# Patient Record
Sex: MALE | Race: WHITE | Employment: FULL TIME | URBAN - METROPOLITAN AREA
[De-identification: names, ages, dates, MRNs, and addresses within clinical notes are randomized per-mention and may not be internally consistent; named-entity substitution may affect disease eponyms.]

---

## 2022-02-08 ENCOUNTER — APPOINTMENT (OUTPATIENT)
Dept: GENERAL RADIOLOGY | Age: 42
End: 2022-02-08
Payer: COMMERCIAL

## 2022-02-08 ENCOUNTER — HOSPITAL ENCOUNTER (EMERGENCY)
Age: 42
Discharge: HOME | End: 2022-02-08
Payer: COMMERCIAL

## 2022-02-08 VITALS
HEART RATE: 60 BPM | SYSTOLIC BLOOD PRESSURE: 126 MMHG | DIASTOLIC BLOOD PRESSURE: 70 MMHG | OXYGEN SATURATION: 98 % | TEMPERATURE: 98.3 F | RESPIRATION RATE: 18 BRPM

## 2022-02-08 DIAGNOSIS — B34.9 VIRAL ILLNESS: ICD-10-CM

## 2022-02-08 DIAGNOSIS — R07.89 CHEST WALL PAIN: Primary | ICD-10-CM

## 2022-02-08 DIAGNOSIS — R53.81 MALAISE: ICD-10-CM

## 2022-02-08 DIAGNOSIS — R50.9 FEBRILE ILLNESS, ACUTE: ICD-10-CM

## 2022-02-08 LAB
A/G RATIO: 2 (ref 1.1–2.2)
ALBUMIN SERPL-MCNC: 4.7 G/DL (ref 3.4–5)
ALP BLD-CCNC: 58 U/L (ref 40–129)
ALT SERPL-CCNC: 28 U/L (ref 10–40)
ANION GAP SERPL CALCULATED.3IONS-SCNC: 15 MMOL/L (ref 3–16)
AST SERPL-CCNC: 24 U/L (ref 15–37)
BASOPHILS ABSOLUTE: 0 K/UL (ref 0–0.2)
BASOPHILS RELATIVE PERCENT: 0.5 %
BILIRUB SERPL-MCNC: 0.8 MG/DL (ref 0–1)
BILIRUBIN URINE: NEGATIVE
BLOOD, URINE: NEGATIVE
BUN BLDV-MCNC: 18 MG/DL (ref 7–20)
CALCIUM SERPL-MCNC: 9.1 MG/DL (ref 8.3–10.6)
CHLORIDE BLD-SCNC: 102 MMOL/L (ref 99–110)
CLARITY: CLEAR
CO2: 26 MMOL/L (ref 21–32)
COLOR: YELLOW
CREAT SERPL-MCNC: 1 MG/DL (ref 0.9–1.3)
EOSINOPHILS ABSOLUTE: 0.1 K/UL (ref 0–0.6)
EOSINOPHILS RELATIVE PERCENT: 3.5 %
GFR AFRICAN AMERICAN: >60
GFR NON-AFRICAN AMERICAN: >60
GLUCOSE BLD-MCNC: 88 MG/DL (ref 70–99)
GLUCOSE URINE: NEGATIVE MG/DL
HCT VFR BLD CALC: 42.5 % (ref 40.5–52.5)
HEMOGLOBIN: 14.7 G/DL (ref 13.5–17.5)
KETONES, URINE: NEGATIVE MG/DL
LEUKOCYTE ESTERASE, URINE: NEGATIVE
LYMPHOCYTES ABSOLUTE: 1.2 K/UL (ref 1–5.1)
LYMPHOCYTES RELATIVE PERCENT: 28.4 %
MCH RBC QN AUTO: 30.4 PG (ref 26–34)
MCHC RBC AUTO-ENTMCNC: 34.7 G/DL (ref 31–36)
MCV RBC AUTO: 87.6 FL (ref 80–100)
MICROSCOPIC EXAMINATION: NORMAL
MONOCYTES ABSOLUTE: 0.5 K/UL (ref 0–1.3)
MONOCYTES RELATIVE PERCENT: 11.7 %
NEUTROPHILS ABSOLUTE: 2.3 K/UL (ref 1.7–7.7)
NEUTROPHILS RELATIVE PERCENT: 55.9 %
NITRITE, URINE: NEGATIVE
PDW BLD-RTO: 13.5 % (ref 12.4–15.4)
PH UA: 6 (ref 5–8)
PLATELET # BLD: 220 K/UL (ref 135–450)
PMV BLD AUTO: 6.8 FL (ref 5–10.5)
POTASSIUM REFLEX MAGNESIUM: 4 MMOL/L (ref 3.5–5.1)
PROTEIN UA: NEGATIVE MG/DL
RAPID INFLUENZA  B AGN: NEGATIVE
RAPID INFLUENZA A AGN: NEGATIVE
RBC # BLD: 4.85 M/UL (ref 4.2–5.9)
SARS-COV-2, NAAT: NOT DETECTED
SODIUM BLD-SCNC: 143 MMOL/L (ref 136–145)
SPECIFIC GRAVITY UA: 1.02 (ref 1–1.03)
TOTAL PROTEIN: 7 G/DL (ref 6.4–8.2)
TROPONIN: <0.01 NG/ML
URINE REFLEX TO CULTURE: NORMAL
URINE TYPE: NORMAL
UROBILINOGEN, URINE: 1 E.U./DL
WBC # BLD: 4.2 K/UL (ref 4–11)

## 2022-02-08 PROCEDURE — 6360000002 HC RX W HCPCS: Performed by: NURSE PRACTITIONER

## 2022-02-08 PROCEDURE — 84484 ASSAY OF TROPONIN QUANT: CPT

## 2022-02-08 PROCEDURE — 87804 INFLUENZA ASSAY W/OPTIC: CPT

## 2022-02-08 PROCEDURE — 71045 X-RAY EXAM CHEST 1 VIEW: CPT

## 2022-02-08 PROCEDURE — 85025 COMPLETE CBC W/AUTO DIFF WBC: CPT

## 2022-02-08 PROCEDURE — 87635 SARS-COV-2 COVID-19 AMP PRB: CPT

## 2022-02-08 PROCEDURE — 99284 EMERGENCY DEPT VISIT MOD MDM: CPT

## 2022-02-08 PROCEDURE — 93005 ELECTROCARDIOGRAM TRACING: CPT | Performed by: EMERGENCY MEDICINE

## 2022-02-08 PROCEDURE — 81003 URINALYSIS AUTO W/O SCOPE: CPT

## 2022-02-08 PROCEDURE — 80053 COMPREHEN METABOLIC PANEL: CPT

## 2022-02-08 PROCEDURE — 96374 THER/PROPH/DIAG INJ IV PUSH: CPT

## 2022-02-08 PROCEDURE — 6370000000 HC RX 637 (ALT 250 FOR IP): Performed by: NURSE PRACTITIONER

## 2022-02-08 RX ORDER — ASPIRIN 81 MG/1
324 TABLET, CHEWABLE ORAL ONCE
Status: COMPLETED | OUTPATIENT
Start: 2022-02-08 | End: 2022-02-08

## 2022-02-08 RX ORDER — KETOROLAC TROMETHAMINE 30 MG/ML
30 INJECTION, SOLUTION INTRAMUSCULAR; INTRAVENOUS ONCE
Status: COMPLETED | OUTPATIENT
Start: 2022-02-08 | End: 2022-02-08

## 2022-02-08 RX ADMIN — KETOROLAC TROMETHAMINE 30 MG: 30 INJECTION, SOLUTION INTRAMUSCULAR at 18:52

## 2022-02-08 RX ADMIN — ASPIRIN 81 MG 324 MG: 81 TABLET ORAL at 18:53

## 2022-02-08 ASSESSMENT — ENCOUNTER SYMPTOMS
ABDOMINAL PAIN: 0
SHORTNESS OF BREATH: 0
SORE THROAT: 0
NAUSEA: 0
BACK PAIN: 0
COUGH: 0
VOMITING: 0
DIARRHEA: 0
COLOR CHANGE: 0
WHEEZING: 0

## 2022-02-08 ASSESSMENT — PAIN SCALES - GENERAL
PAINLEVEL_OUTOF10: 4
PAINLEVEL_OUTOF10: 6

## 2022-02-08 ASSESSMENT — PAIN DESCRIPTION - LOCATION
LOCATION: CHEST
LOCATION: CHEST

## 2022-02-08 ASSESSMENT — PAIN DESCRIPTION - PAIN TYPE: TYPE: ACUTE PAIN

## 2022-02-08 NOTE — ED PROVIDER NOTES
**ADVANCED PRACTICE PROVIDER, I HAVE EVALUATED THIS [facility]  ED  EMERGENCY DEPARTMENT ENCOUNTER      Pt Name: Mamie Jimenez  GENA:[id number]  [place] 1980  Date of evaluation: 2/8/2022  Provider: GIRMA Noyola CNP      Chief Complaint:    Chief Complaint   Patient presents with    Chest Pain     started sunday, does not radiate, 6/10. fever sunday, covid in december    Fever         Nursing Notes, Past Medical Hx, Past Surgical Hx, Social Hx, Allergies, and Family Hx were all reviewed and agreed with or any disagreements were addressed in the HPI.    HPI: (Location, Duration, Timing, Severity, Quality, Assoc Sx, Context, Modifying factors)    Chief Complaint of left-sided chest pain, fever, malaise    This is a  37 y.o. male who presents with chest pain in the left side of his chest that started Sunday afternoon, states the discomfort came really out of no where, but started with a fever and feeling fatigued later that afternoon, had a fever with tmax of 102.1, felt run down yesterday as well. Took a three hour nap on Sunday, two hour nap yesterday and states that's not typical for him. He denies any cough, congestion, pleuritic chest pain, but does states his chest discomfort seem to get worse when he was laying down last night. He states that he had covid before [place], but has not been checked again. He denies any HA, neck pain or neck stiffness. He states he is concerned because his grandfather passed away at 29 from a heart attack, although he has no personal history of heart disease, high cholesterol, high blood pressure or diabetes. He denies any nausea vomiting or diarrhea. He denies taking any medicine for his symptoms. He denies any additional complaints. No additional aggravating relieving factors. Patient presents awake, alert and in no acute distress or toxic appearance. PastMedical/Surgical History:  History reviewed.  No pertinent past medical history. History reviewed. No pertinent surgical history. Medications:  Previous Medications    No medications on file         Review of Systems:  (2-9 systems needed)  Review of Systems   Constitutional: Positive for chills and fever. Patient reports tactile fever and chills that started on Sunday along with the chest discomfort. States he felt down. States he already had Covid back in December before [place]. HENT: Negative for congestion and sore throat. Respiratory: Negative for cough, shortness of breath and wheezing. Cardiovascular: Positive for chest pain. Patient planes of left-sided chest pain since Sunday, states the pain comes and goes, he also started a fever associated with it, rates the discomfort a 6 out of 10. However denies any pleuritic chest pain or shortness of breath   Gastrointestinal: Negative for abdominal pain, diarrhea, nausea and vomiting. Genitourinary: Negative for difficulty urinating, dysuria, frequency, hematuria and urgency. Musculoskeletal: Positive for myalgias. Negative for back pain. He has had occasional body aches along with the fever and chest discomfort, also reports feeling rundown. Skin: Negative for color change. Neurological: Negative for weakness, numbness and headaches. \"Positives and Pertinent negatives as per HPI\"    Physical Exam:  Physical Exam  Vitals and nursing note reviewed. Constitutional:       Appearance: He is well-developed. He is not diaphoretic. HENT:      Head: Normocephalic. Right Ear: External ear normal.      Left Ear: External ear normal.   Eyes:      General: No scleral icterus. Right eye: No discharge. Left eye: No discharge. Cardiovascular:      Rate and Rhythm: Normal rate. Comments: Normal spine and 2, peripheral pulses 2+, no edema observed. Pulmonary:      Effort: Pulmonary effort is normal. No respiratory distress.       Breath sounds: Normal breath sounds. Comments: Lungs are clear anteriorly and posteriorly, patient is not tachypneic or dyspneic, saturations are 98% on room air. No palpable crepitus or chest deformity. Abdominal:      Palpations: Abdomen is soft. Musculoskeletal:         General: Normal range of motion. Cervical back: Normal range of motion and neck supple. Skin:     General: Skin is warm. Capillary Refill: Capillary refill takes less than 2 seconds. Coloration: Skin is not pale. Neurological:      General: No focal deficit present. Mental Status: He is alert and oriented to person, place, and time. GCS: GCS eye subscore is 4. GCS verbal subscore is 5. GCS motor subscore is 6. Cranial Nerves: Cranial nerves are intact. Sensory: Sensation is intact. Motor: Motor function is intact. Comments: Patient is awake, alert following commands correctly, neurologic intact no focal deficits.    Psychiatric:         Behavior: Behavior normal.         MEDICAL DECISION MAKING    Vitals:    Vitals:    02/08/22 1919 02/08/22 1921 02/08/22 1946 02/08/22 2001   BP: (!) 134/92  128/72    Pulse: 60  58 54   Resp: 14  11 16   Temp:  98.3 °F (36.8 °C)     TempSrc:  Oral     SpO2: 97%  97% 96%       LABS:  Labs Reviewed   COVID-19, RAPID    Narrative:     Performed at:  [place] [address] [facility]   Phone [phone]   RAPID INFLUENZA A/B ANTIGENS    Narrative:     Performed at:  [place] [address] [facility]   Phone [phone]   CBC WITH AUTO DIFFERENTIAL    Narrative:     Performed at:  [place] [address] [facility]   Phone [phone]   COMPREHENSIVE METABOLIC PANEL W/ REFLEX TO MG FOR LOW K    Narrative:     Performed at:  [place] [address] [facility]   Phone [phone]   URINE RT REFLEX TO CULTURE    Narrative:     Performed at:  [facility]  [address],  [place], [facility]   Phone [phone]   TROPONIN    Narrative:     Performed at:  [facility]  [address],  [place], [address]   Phone  of labs reviewed and were negative at this time or not returned at the time of this note. RADIOLOGY:   Non-plain film images such as CT, Ultrasound and MRI are read by the radiologist. Vanessa PATTERSON APRN - CNP have directly visualized the radiologic plain film image(s) with the below findings:      Interpretation per the Radiologist below, if available at the time of this note:    XR CHEST PORTABLE   Final Result   No acute cardiopulmonary abnormality              MEDICAL DECISION MAKING / ED COURSE:    Because of high probability of sudden clinical deterioration of the patient's condition and risk of further deterioration, critical care time required my full attention to the patient's condition; which included chart data review, documentation, medication ordering, reviewing the patient's old records, reevaluation patient's cardiac, pulmonary and neurological status. Reevaluation of vital signs. Consultations with ED attending and admitting physician. Ordering, interpreting reviewing diagnostic testing. Therefore a critical care time was 15 minutes of direct attention to the patient's condition did not include time spent on procedures.     PROCEDURES:   Procedures    None    Patient was given:  Medications   ketorolac (TORADOL) injection 30 mg (30 mg IntraVENous Given 2/8/22 1852)   aspirin chewable tablet 324 mg (324 mg Oral Given 2/8/22 1853)       Patient complains of chest pain in the left side of his chest that started Sunday afternoon, states the discomfort came really out of no where, but started with a fever and feeling fatigued later that afternoon, had a fever with tmax of 102.1, felt run down yesterday as well. Took a three hour nap on Sunday, two hour nap yesterday and states that's not typical for him. He denies any cough, congestion, pleuritic chest pain, but does states his chest discomfort seem to get worse when he was laying down last night. After evaluation and examination patient I do believe this is related all to a viral illness however the patient was concerned because of his grandfather's history although he has no personal history of heart disease high blood pressure or high cholesterol. He also is not diabetic. IV access, blood work, chest x-ray and EKG were obtained. He was given aspirin and Toradol for his discomfort. Urine shows no infection. EKG shows sinus rhythm rate of 63 bpm, no acute ST elevation, please see the attending physician documentation for EKG interpretation note CBC shows no sepsis or anemia. Metabolic panel shows no electrolyte disturbances or renal insufficiency. Liver functions are normal.  Opponent is negative. Flu is negative. Covid is negative. Chest x-ray shows no acute cardiopulmonary findings. Upon reevaluation vital signs are stable, at this time of no concerns for acute coronary syndrome, pneumonia, pneumothorax or PE. Therefore, shared medical decision was made between the patient and myself we agreed the patient could be discharged home with outpatient follow-up. Patient was discharged home with referral back to PCP. Take Tylenol and/or Motrin for any fevers or body aches. Return to the ER for worsening or concerning symptoms. The patient tolerated their visit well. I evaluated the patient. The physician was available for consultation as needed. The patient and / or the family were informed of the results of any tests, a time was given to answer questions, a plan was proposed and they agreed with plan.   Patient verbalized understanding of discharge instructions and was discharged from the department in stable condition    CLINICAL IMPRESSION:  1. Chest wall pain    2. Malaise    3. Febrile illness, acute    4.  Viral illness        DISPOSITION Decision To Discharge 02/08/2022 09:27:21 PM      PATIENT REFERRED TO:  Nasir Zhao  [phone]  Schedule an appointment as soon as possible for a visit in 2 days  This is a family doctor referral, follow-up with your family doctor or this family doctor in the next 2 to 3 days for reevaluation      DISCHARGE MEDICATIONS:  New Prescriptions    No medications on file       DISCONTINUED MEDICATIONS:  Discontinued Medications    No medications on file              (Please note the MDM and HPI sections of this note were completed with a voice recognition program.  Efforts were made to edit the dictations but occasionally words are mis-transcribed.)    Electronically signed, Shelle Oppenheim, APRN - CNP,          Shelle Oppenheim, APRN - CNP  02/08/22 0880

## 2022-02-09 LAB
EKG ATRIAL RATE: 63 BPM
EKG DIAGNOSIS: NORMAL
EKG P AXIS: 48 DEGREES
EKG P-R INTERVAL: 154 MS
EKG Q-T INTERVAL: 398 MS
EKG QRS DURATION: 90 MS
EKG QTC CALCULATION (BAZETT): 407 MS
EKG R AXIS: 76 DEGREES
EKG T AXIS: 41 DEGREES
EKG VENTRICULAR RATE: 63 BPM

## 2022-02-09 PROCEDURE — 93010 ELECTROCARDIOGRAM REPORT: CPT | Performed by: INTERNAL MEDICINE

## 2022-02-09 NOTE — ED NOTES
Epic Downtime results received. Rapid Covid Negative. Rapid Flu Negative.          Mahesh Avila, GALLO  02/08/22 1944